# Patient Record
Sex: FEMALE | Race: WHITE | NOT HISPANIC OR LATINO | URBAN - METROPOLITAN AREA
[De-identification: names, ages, dates, MRNs, and addresses within clinical notes are randomized per-mention and may not be internally consistent; named-entity substitution may affect disease eponyms.]

---
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
## 2019-04-05 PROCEDURE — 87086 URINE CULTURE/COLONY COUNT: CPT | Performed by: FAMILY MEDICINE

## 2019-04-05 PROCEDURE — 87077 CULTURE AEROBIC IDENTIFY: CPT | Performed by: FAMILY MEDICINE

## 2021-09-18 ENCOUNTER — WALK IN (OUTPATIENT)
Dept: URGENT CARE | Age: 21
End: 2021-09-18

## 2021-09-18 VITALS
TEMPERATURE: 99.5 F | HEART RATE: 96 BPM | SYSTOLIC BLOOD PRESSURE: 120 MMHG | RESPIRATION RATE: 18 BRPM | DIASTOLIC BLOOD PRESSURE: 84 MMHG

## 2021-09-18 DIAGNOSIS — J06.9 VIRAL URI: Primary | ICD-10-CM

## 2021-09-18 LAB
INTERNAL PROCEDURAL CONTROLS ACCEPTABLE: YES
S PYO AG THROAT QL IA.RAPID: NEGATIVE

## 2021-09-18 PROCEDURE — U0003 INFECTIOUS AGENT DETECTION BY NUCLEIC ACID (DNA OR RNA); SEVERE ACUTE RESPIRATORY SYNDROME CORONAVIRUS 2 (SARS-COV-2) (CORONAVIRUS DISEASE [COVID-19]), AMPLIFIED PROBE TECHNIQUE, MAKING USE OF HIGH THROUGHPUT TECHNOLOGIES AS DESCRIBED BY CMS-2020-01-R: HCPCS | Performed by: NURSE PRACTITIONER

## 2021-09-18 PROCEDURE — 99202 OFFICE O/P NEW SF 15 MIN: CPT | Performed by: NURSE PRACTITIONER

## 2021-09-18 PROCEDURE — 87880 STREP A ASSAY W/OPTIC: CPT | Performed by: NURSE PRACTITIONER

## 2021-09-18 PROCEDURE — U0005 INFEC AGEN DETEC AMPLI PROBE: HCPCS | Performed by: NURSE PRACTITIONER

## 2021-09-18 ASSESSMENT — ENCOUNTER SYMPTOMS
SORE THROAT: 1
TROUBLE SWALLOWING: 0
FATIGUE: 0
SINUS PAIN: 0
HEADACHES: 1
VOMITING: 0
SWOLLEN GLANDS: 0
FEVER: 0
CHILLS: 0
NAUSEA: 0
WHEEZING: 0
CHEST TIGHTNESS: 0
RHINORRHEA: 1
WEAKNESS: 0
COUGH: 1
LIGHT-HEADEDNESS: 0
SHORTNESS OF BREATH: 0
DIZZINESS: 0

## 2021-09-19 LAB
SARS-COV-2 RNA RESP QL NAA+PROBE: NOT DETECTED
SERVICE CMNT-IMP: NORMAL
SERVICE CMNT-IMP: NORMAL

## 2023-08-10 ENCOUNTER — OFFICE VISIT (OUTPATIENT)
Dept: FAMILY MEDICINE CLINIC | Facility: CLINIC | Age: 23
End: 2023-08-10
Payer: COMMERCIAL

## 2023-08-10 VITALS
HEIGHT: 65 IN | HEART RATE: 92 BPM | SYSTOLIC BLOOD PRESSURE: 122 MMHG | BODY MASS INDEX: 35.65 KG/M2 | OXYGEN SATURATION: 97 % | RESPIRATION RATE: 16 BRPM | WEIGHT: 214 LBS | DIASTOLIC BLOOD PRESSURE: 78 MMHG | TEMPERATURE: 99 F

## 2023-08-10 DIAGNOSIS — H61.111: ICD-10-CM

## 2023-08-10 DIAGNOSIS — Z00.00 ENCOUNTER FOR MEDICAL EXAMINATION TO ESTABLISH CARE: Primary | ICD-10-CM

## 2023-08-10 DIAGNOSIS — F41.1 GENERALIZED ANXIETY DISORDER: ICD-10-CM

## 2023-08-10 DIAGNOSIS — Z72.0 TOBACCO USE: ICD-10-CM

## 2023-08-10 DIAGNOSIS — G43.709 CHRONIC MIGRAINE WITHOUT AURA WITHOUT STATUS MIGRAINOSUS, NOT INTRACTABLE: ICD-10-CM

## 2023-08-10 PROCEDURE — 3008F BODY MASS INDEX DOCD: CPT | Performed by: FAMILY MEDICINE

## 2023-08-10 PROCEDURE — 3074F SYST BP LT 130 MM HG: CPT | Performed by: FAMILY MEDICINE

## 2023-08-10 PROCEDURE — 3078F DIAST BP <80 MM HG: CPT | Performed by: FAMILY MEDICINE

## 2023-08-10 PROCEDURE — 99204 OFFICE O/P NEW MOD 45 MIN: CPT | Performed by: FAMILY MEDICINE

## 2023-08-10 RX ORDER — VENLAFAXINE HYDROCHLORIDE 37.5 MG/1
37.5 CAPSULE, EXTENDED RELEASE ORAL DAILY
Qty: 30 CAPSULE | Refills: 1 | Status: SHIPPED | OUTPATIENT
Start: 2023-08-10

## 2023-08-10 RX ORDER — RIZATRIPTAN BENZOATE 5 MG/1
5 TABLET, ORALLY DISINTEGRATING ORAL AS NEEDED
Qty: 12 TABLET | Refills: 1 | Status: SHIPPED | OUTPATIENT
Start: 2023-08-10

## 2023-10-04 DIAGNOSIS — F41.1 GENERALIZED ANXIETY DISORDER: ICD-10-CM

## 2023-10-05 NOTE — TELEPHONE ENCOUNTER
LOV 8/10/2023     LAST LAB     LAST RX   Medication Quantity Refills Start End   venlafaxine ER 37.5 MG Oral Capsule SR 24 Hr           Next OV   Future Appointments   Date Time Provider Sary Plascencia   10/17/2023  5:20 PM Regino Pan MD [facility]        PROTOCOL NONE

## 2023-10-09 DIAGNOSIS — F41.1 GENERALIZED ANXIETY DISORDER: ICD-10-CM

## 2023-10-09 RX ORDER — VENLAFAXINE HYDROCHLORIDE 37.5 MG/1
37.5 CAPSULE, EXTENDED RELEASE ORAL DAILY
Qty: 30 CAPSULE | Refills: 1 | Status: SHIPPED | OUTPATIENT
Start: 2023-10-09

## 2023-10-11 RX ORDER — VENLAFAXINE HYDROCHLORIDE 37.5 MG/1
37.5 CAPSULE, EXTENDED RELEASE ORAL DAILY
Qty: 30 CAPSULE | Refills: 1 | OUTPATIENT
Start: 2023-10-11

## 2023-10-17 ENCOUNTER — OFFICE VISIT (OUTPATIENT)
Facility: LOCATION | Age: 23
End: 2023-10-17
Payer: COMMERCIAL

## 2023-10-17 DIAGNOSIS — H93.8X2 MASS OF LEFT EAR CANAL: Primary | ICD-10-CM

## 2023-10-17 PROCEDURE — 99203 OFFICE O/P NEW LOW 30 MIN: CPT | Performed by: OTOLARYNGOLOGY

## 2023-10-17 NOTE — PROGRESS NOTES
Jenna Willett is a 20year old female. No chief complaint on file. HPI:   Her mom is a deaf . She has mass in her ear canal.  The patient has not had any pain or hearing loss associated. Current Outpatient Medications   Medication Sig Dispense Refill    venlafaxine ER 37.5 MG Oral Capsule SR 24 Hr Take 1 capsule (37.5 mg total) by mouth daily. 30 capsule 1    traZODone 50 MG Oral Tab Take 1 tablet (50 mg total) by mouth nightly. 30 tablet 1    rizatriptan (MAXALT-MLT) 5 MG Oral Tablet Dispersible Take 1 tablet (5 mg total) by mouth as needed for Migraine. 12 tablet 1      Past Medical History:   Diagnosis Date    Acne 01/08/2013    Allergic rhinitis     Anxiety     Depression     Obesity, REMISSION SINCE 2011 09/27/2010      Social History:  Social History     Socioeconomic History    Marital status: Single   Occupational History    Occupation: STUDENT   Tobacco Use    Smoking status: Every Day    Smokeless tobacco: Never    Tobacco comments:     Vape   Vaping Use    Vaping Use: Every day    Substances: Nicotine    Devices: Disposable   Substance and Sexual Activity    Alcohol use: Yes     Alcohol/week: 5.0 standard drinks of alcohol     Types: 5 Cans of beer per week    Drug use: No     Comment: NEVER    Sexual activity: Yes     Partners: Female   Other Topics Concern     Service No    Blood Transfusions No    Caffeine Concern Yes     Comment: pepsi- 1 can per day    Stress Concern Yes    Weight Concern Yes    Exercise No    Bike Helmet No    Seat Belt Yes   Social History Narrative    LIVES  WITH MOM, STEP-DAD IN SINGLE FAMILY HOUSE. No past surgical history on file.       REVIEW OF SYSTEMS:   GENERAL HEALTH: feels well otherwise  GENERAL : denies fever, chills, sweats, weight loss, weight gain  SKIN: denies any unusual skin lesions or rashes  RESPIRATORY: denies shortness of breath with exertion  NEURO: denies headaches    EXAM:   LMP 07/10/2023     System Findings Details Constitutional  Overall appearance - Normal.   Psychiatric  Orientation - Oriented to time, place, person & situation. Appropriate mood and affect. Head/Face  Facial features -- Normal. Skull - Normal.   Eyes  Pupils equal ,round ,react to light and accomidate   Ears, Nose, Throat, Neck  Both ears were evaluated the microscope. The right ear is clear. There is a very tiny pinpoint cyst in the ear canal on the left-hand side tympanic membrane is clear otherwise the pinnas are normal.   Neurological  Memory - Normal. Cranial nerves - Cranial nerves II through XII grossly intact. Lymph Detail  Submental. Submandibular. Anterior cervical. Posterior cervical. Supraclavicular. ASSESSMENT AND PLAN:   1. Mass of left ear canal  She has a small epidermal inclusion cyst in the ear canal on the left-hand side it is very tiny. Given the above we can be conservative. If there should be pain or swelling or hearing loss associated she will see me back. The patient indicates understanding of these issues and agrees to the plan. No follow-ups on file.     Ludin Alatorre MD  10/17/2023  5:31 PM

## 2023-11-17 DIAGNOSIS — F51.01 PRIMARY INSOMNIA: ICD-10-CM

## 2023-11-17 RX ORDER — TRAZODONE HYDROCHLORIDE 50 MG/1
50 TABLET ORAL NIGHTLY
Qty: 30 TABLET | Refills: 1 | Status: SHIPPED | OUTPATIENT
Start: 2023-11-17 | End: 2023-11-20

## 2023-11-17 NOTE — TELEPHONE ENCOUNTER
LOV 9/21/23    LAST LAB    LAST RX   traZODone 50 MG Oral Tab 30 tablet 1 9/21/2023    Sig:   Take 1 tablet (50 mg total) by mouth nightly.          Next OV   Future Appointments   Date Time Provider Sary Plascencia   11/20/2023  9:20 AM Irma Benson DO [facility]        PROTOCOL NONE

## 2023-12-08 DIAGNOSIS — F41.1 GENERALIZED ANXIETY DISORDER: ICD-10-CM

## 2023-12-08 RX ORDER — VENLAFAXINE HYDROCHLORIDE 37.5 MG/1
37.5 CAPSULE, EXTENDED RELEASE ORAL DAILY
Qty: 30 CAPSULE | Refills: 1 | OUTPATIENT
Start: 2023-12-08

## 2024-01-28 DIAGNOSIS — F51.01 PRIMARY INSOMNIA: ICD-10-CM

## 2024-01-29 RX ORDER — TRAZODONE HYDROCHLORIDE 50 MG/1
50 TABLET ORAL NIGHTLY
Qty: 30 TABLET | Refills: 0 | Status: SHIPPED | OUTPATIENT
Start: 2024-01-29

## 2024-01-29 NOTE — TELEPHONE ENCOUNTER
LOV 8/10/2023     LAST LAB     LAST RX  Medication Quantity Refills Start End   traZODone 50 MG Oral Tab 135 tablet 0 11/20/2023 2/18/2024   Sig:   Take 1.5 tablets (75 mg total) by mouth nightly.         Next OV   Future Appointments   Date Time Provider Department Center   2/2/2024  7:00 AM Sue Bird DO [facility]        PROTOCOL none

## 2024-01-30 ENCOUNTER — TELEMEDICINE (OUTPATIENT)
Dept: FAMILY MEDICINE CLINIC | Facility: CLINIC | Age: 24
End: 2024-01-30
Payer: COMMERCIAL

## 2024-01-30 DIAGNOSIS — F41.1 GENERALIZED ANXIETY DISORDER: ICD-10-CM

## 2024-01-30 DIAGNOSIS — Z56.6 WORK STRESS: ICD-10-CM

## 2024-01-30 DIAGNOSIS — F51.01 PRIMARY INSOMNIA: ICD-10-CM

## 2024-01-30 PROCEDURE — 99214 OFFICE O/P EST MOD 30 MIN: CPT | Performed by: FAMILY MEDICINE

## 2024-01-30 RX ORDER — VENLAFAXINE 75 MG/1
75 TABLET ORAL 2 TIMES DAILY
Qty: 30 TABLET | Refills: 1 | Status: SHIPPED | OUTPATIENT
Start: 2024-01-30

## 2024-01-30 RX ORDER — HYDROXYZINE HYDROCHLORIDE 25 MG/1
25 TABLET, FILM COATED ORAL EVERY 6 HOURS PRN
Qty: 30 TABLET | Refills: 1 | Status: SHIPPED | OUTPATIENT
Start: 2024-01-30

## 2024-01-30 SDOH — HEALTH STABILITY - MENTAL HEALTH: OTHER PHYSICAL AND MENTAL STRAIN RELATED TO WORK: Z56.6
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
## 2024-02-09 DIAGNOSIS — F41.1 GENERALIZED ANXIETY DISORDER: ICD-10-CM

## 2024-02-09 RX ORDER — VENLAFAXINE 75 MG/1
75 TABLET ORAL 2 TIMES DAILY
Qty: 60 TABLET | Refills: 1 | Status: SHIPPED | OUTPATIENT
Start: 2024-02-09

## 2024-02-15 DIAGNOSIS — F41.1 GENERALIZED ANXIETY DISORDER: ICD-10-CM

## 2024-02-15 RX ORDER — VENLAFAXINE HYDROCHLORIDE 37.5 MG/1
37.5 CAPSULE, EXTENDED RELEASE ORAL DAILY
Qty: 90 CAPSULE | Refills: 0 | OUTPATIENT
Start: 2024-02-15

## 2024-02-15 NOTE — TELEPHONE ENCOUNTER
LOV    LAST LAB    LAST RX   Medication Quantity Refills Start End   venlafaxine 75 MG Oral Tab 60 tablet 1 2/9/2024 --   Sig:   Take 1 tablet (75 mg total) by mouth 2 (two) times daily.     Route:   Oral         Next OV    PROTOCOL     Psychiatric Non-Scheduled (Anti-Anxiety) [id number]/15/2024 12:26 AM   Protocol Details In person appointment or virtual visit in the past 6 mos or appointment in next 3 mos    Depression Screening completed within the past 12 months

## 2024-02-25 DIAGNOSIS — F51.01 PRIMARY INSOMNIA: ICD-10-CM

## 2024-02-27 RX ORDER — TRAZODONE HYDROCHLORIDE 50 MG/1
50 TABLET ORAL NIGHTLY
Qty: 30 TABLET | Refills: 0 | Status: SHIPPED | OUTPATIENT
Start: 2024-02-27

## 2024-02-27 NOTE — TELEPHONE ENCOUNTER
LOV 1/30/24    LAST LAB     LAST RX   Medication Quantity Refills Start End   traZODone 50 MG Oral Tab 30 tablet 0 1/29/2024 --   Sig:   Take 1 tablet (50 mg total) by mouth nightly.         Next OV No future appointments.     PROTOCOL  NONE

## 2024-02-27 NOTE — TELEPHONE ENCOUNTER
Return in about 6 weeks (around 3/12/2024) for F/U Mental Health. [facility] sent

## 2024-03-30 DIAGNOSIS — F51.01 PRIMARY INSOMNIA: ICD-10-CM

## 2024-04-01 RX ORDER — TRAZODONE HYDROCHLORIDE 50 MG/1
50 TABLET ORAL NIGHTLY
Qty: 90 TABLET | Refills: 0 | Status: SHIPPED | OUTPATIENT
Start: 2024-04-01

## 2024-04-01 NOTE — TELEPHONE ENCOUNTER
LOV 1/30/2024    LAST LAB    LAST RX   Medication Quantity Refills Start End   traZODone 50 MG Oral Tab 30 tablet 0 2/27/2024 --   Sig:   Take 1 tablet (50 mg total) by mouth nightly.     Route:   Oral     Order #:   [id number]         Next OV   Future Appointments   Date Time Provider Department Center   4/9/2024  4:00 PM Sue Bird DO [facility]        PROTOCOL NONE

## 2024-07-17 DIAGNOSIS — F51.01 PRIMARY INSOMNIA: ICD-10-CM

## 2024-07-19 RX ORDER — TRAZODONE HYDROCHLORIDE 50 MG/1
75 TABLET ORAL NIGHTLY
Qty: 135 TABLET | Refills: 0 | Status: SHIPPED | OUTPATIENT
Start: 2024-07-19

## 2024-08-01 DIAGNOSIS — F41.1 GENERALIZED ANXIETY DISORDER: ICD-10-CM

## 2024-08-05 RX ORDER — VENLAFAXINE HYDROCHLORIDE 75 MG/1
75 CAPSULE, EXTENDED RELEASE ORAL DAILY
Qty: 90 CAPSULE | Refills: 0 | Status: SHIPPED | OUTPATIENT
Start: 2024-08-05

## 2024-08-05 NOTE — TELEPHONE ENCOUNTER
Please review: Medication passes protocol, but unable to refill due to high/very high drug interaction warning copied here:    High  Drug-Drug: traZODone and venlafaxine ERSerotonergic effects of trazodone and serotonin/norepinephrine reuptake inhibitors (SNRIs) may be additive. The risk of serotonin syndrome/toxicity may be increased.    Refill passes per [facility] protocol.    No future appointments.  LAST OFFICE VISIT: 4/9/2024 (telemedicine)    Requested Prescriptions   Pending Prescriptions Disp Refills    VENLAFAXINE ER 75 MG Oral Capsule SR 24 Hr [Pharmacy Med Name: VENLAFAXINE HCL ER 75 MG CAP] 90 capsule 0     Sig: TAKE 1 CAPSULE BY MOUTH EVERY DAY       Psychiatric Non-Scheduled (Anti-Anxiety) Passed - 8/1/2024 12:07 AM        Passed - In person appointment or virtual visit in the past 6 mos or appointment in next 3 mos     Recent Outpatient Visits              3 months ago Generalized anxiety disorder    [facility], [address] [place] [address] Sue Bird DO    Telemedicine    6 months ago Generalized anxiety disorder    [address] Sue Bird DO    Telemedicine    8 months ago Primary insomnia    [address] Sue Bird DO    Telemedicine    9 months ago Mass of left ear canal    [facility], [place], [place] Jensen Teresa MD    Office Visit    10 months ago Generalized anxiety disorder    [address] Sue Bird DO    Telemedicine                      Passed - Depression Screening completed within the past 12 months               Recent Outpatient Visits              3 months ago Generalized anxiety disorder    [address]    Telemedicine    6 months ago Generalized anxiety disorder    [address]  Sue Bird,     Telemedicine    8 months ago Primary insomnia    [facility], [address] [place] [address] Sue Bird DO    Telemedicine    9 months ago Mass of left ear canal    [facility], [place], Jensen Mena MD    Office Visit    10 months ago Generalized anxiety disorder    [facility], [address] [place] [address] Sue Bird DO    Telemedicine

## 2024-09-16 ENCOUNTER — OFFICE VISIT (OUTPATIENT)
Dept: FAMILY MEDICINE CLINIC | Facility: CLINIC | Age: 24
End: 2024-09-16
Payer: COMMERCIAL

## 2024-09-16 VITALS
HEIGHT: 65 IN | RESPIRATION RATE: 16 BRPM | HEART RATE: 92 BPM | SYSTOLIC BLOOD PRESSURE: 124 MMHG | BODY MASS INDEX: 37.15 KG/M2 | WEIGHT: 223 LBS | DIASTOLIC BLOOD PRESSURE: 78 MMHG | TEMPERATURE: 99 F | OXYGEN SATURATION: 99 %

## 2024-09-16 DIAGNOSIS — Z71.6 ENCOUNTER FOR TOBACCO USE CESSATION COUNSELING: ICD-10-CM

## 2024-09-16 DIAGNOSIS — F41.1 GENERALIZED ANXIETY DISORDER: ICD-10-CM

## 2024-09-16 DIAGNOSIS — Z00.01 ENCOUNTER FOR GENERAL ADULT MEDICAL EXAMINATION WITH ABNORMAL FINDINGS: Primary | ICD-10-CM

## 2024-09-16 DIAGNOSIS — E55.9 VITAMIN D DEFICIENCY: ICD-10-CM

## 2024-09-16 DIAGNOSIS — G43.709 CHRONIC MIGRAINE WITHOUT AURA WITHOUT STATUS MIGRAINOSUS, NOT INTRACTABLE: ICD-10-CM

## 2024-09-16 DIAGNOSIS — F51.01 PRIMARY INSOMNIA: ICD-10-CM

## 2024-09-16 DIAGNOSIS — Z72.0 TOBACCO USE: ICD-10-CM

## 2024-09-16 DIAGNOSIS — Z53.20 PAP SMEAR OF CERVIX DECLINED: ICD-10-CM

## 2024-09-16 RX ORDER — TRAZODONE HYDROCHLORIDE 50 MG/1
75 TABLET, FILM COATED ORAL NIGHTLY
Qty: 135 TABLET | Refills: 1 | Status: SHIPPED | OUTPATIENT
Start: 2024-09-16

## 2024-09-16 RX ORDER — VENLAFAXINE HYDROCHLORIDE 75 MG/1
75 CAPSULE, EXTENDED RELEASE ORAL DAILY
Qty: 90 CAPSULE | Refills: 1 | Status: SHIPPED | OUTPATIENT
Start: 2024-09-16

## 2024-09-16 NOTE — PROGRESS NOTES
Family Medicine Video Visit  ASSESSMENT & PLAN  Lucrecia Montoya is a 24 year old female who is here for:     1. Encounter for general adult medical examination with abnormal findings  Wellness Exam done today and routine Preventative Care discussed as noted below.   -Pap smear: -  Declined   -G&C testing: declined  -Contraception:  none   -Routine labs ordered.   -Healthy eating habits and regular exercise encouraged   - Comp Metabolic Panel (14); Future  - TSH W Reflex To Free T4; Future  - Lipid Panel; Future    2. Vitamin D deficiency-- Vitamin D [E]; Future    3. Tobacco use- - not Ready to quit today  - Discussed benefits of quitting, as well as resources and quit aids     4. Generalized anxiety disorder  chronic, improved with increased Effexor.  - Contin [id number]  - Contin  Effexor 75mg -   - Hydroxyzine PRN panic.    -CBT/Counseling: currently engaged in counseling.    -Aware of importance of adequate sleep, nutrition, and physical activity.   - venlafaxine ER 75 MG Oral Capsule SR 24 Hr; Take 1 capsule (75 mg total) by mouth daily.  Dispense: 90 capsule; Refill: 1    5. Chronic migraine without aura without status migrainosus, not intractable- chronic, stable with PRN maxalt, uses very infreuqent.      6. Primary insomnia- Chronic, Stable, contin meds as below   - traZODone 50 MG Oral Tab; Take 1.5 tablets (75 mg total) by mouth nightly.  Dispense: 135 tablet; Refill: 1    7. Pap smear of cervix declined- same sex relationship, no prior male partners, discussed risk of cervical cancer, plan to defer for now     Follow-Up: Return in about 6 months (around 3/16/2025) for F/U Mental Health.      Sue Bird DO      CC: Physical    SUBJECTIVE   History of Present Illness:  History obtained from patient.    Lucrecia Montoya is a 24 year old female who presents for Physical     Since last visit, engaged!!!     ANNUAL PHYSICAL  Menses: Regular without any break through bleeding  --- every third period is  painful, but overall does okay   LMP: Patient's last menstrual period was 2024.  Concern for STI: Denies   Contraception: none- same sex relationship-   Cervical Cancer Screening- Pap   PMH of Abnormal Pap: denies   Exercise: generally tries to be active  Healthy eating habits:  Well-rounded  Tobacco: Vaping daily -- nicotine   Immunizations: PCV eligible       ANXIETY- Chronically has struggled for many years- middle school.  Counseling in middle school through HS.  College was started on Zoloft. No SI/HI.  No mental health hosp.   Prior meds- Sertraline, felt like she had a normal sleep schedule   Effexor started Aug 2023, dose increased to 75mg 2024.    Trazodone started 2023  Counseling- off/on   24 - Doing well no concerns.   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 90 capsule 1    traZODone 50 MG Oral Tab Take 1.5 tablets (75 mg total) by mouth nightly. 135 tablet 1    rizatriptan (MAXALT-MLT) 5 MG Oral Tablet Dispersible Take 1 tablet (5 mg total) by mouth as needed for Migraine. 12 tablet 1    hydrOXYzine 25 MG Oral Tab Take 1 tablet (25 mg total) by mouth every 6 (six) hours as needed for Anxiety. 30 tablet 1      Past Medical History:  Past Medical History:    Acne    Allergic rhinitis    Anxiety    Depression    Obesity, REMISSION SINCE       Past Surgical History:  History reviewed. No pertinent surgical  history.   Family History:  Family History   Problem Relation Age of Onset    Obesity Father     Psychiatric Father         ETOHIC    Other (Other) Father         MIGRAINES    Lipids Maternal Grandmother     Lipids Maternal Grandfather     Cancer Paternal Grandmother 72        LUNG (SMOKER)    Diabetes Paternal Grandfather     Obesity Paternal Grandfather       Social History:  Social History     Socioeconomic History    Marital status: Single   Occupational History    Occupation: STUDENT   Tobacco Use    Smoking status: Never    Smokeless tobacco: Never    Tobacco comments:     Vape   Vaping Use    Vaping status: Every Day    Substances: Nicotine    Devices: Disposable   Substance and Sexual Activity    Alcohol use: Yes     Alcohol/week: 5.0 standard drinks of alcohol     Types: 5 Cans of beer per week    Drug use: No     Comment: NEVER    Sexual activity: Yes     Partners: Female   Other Topics Concern     Service No    Blood Transfusions No    Caffeine Concern Yes    Stress Concern Yes    Weight Concern Yes    Exercise No    Bike Helmet No    Seat Belt Yes   Social History Narrative    LIVES  WITH MOM, STEP-DAD IN SINGLE FAMILY HOUSE.       Allergies:  Allergies   Allergen Reactions    Grass RASH          OBJECTIVE   VITALS: /78   Pulse 92   Temp 98.5 °F (36.9 °C) (Temporal)   Resp 16   Ht 5' 5\" (1.651 m)   Wt 223 lb (101.2 kg)   LMP 08/26/2024   SpO2 99%   BMI 37.11 kg/m²    BP Readings from Last 3 Encounters:   09/16/24 124/78   08/10/23 122/78   10/03/19 110/80       Wt Readings from Last 3 Encounters:   09/16/24 223 lb (101.2 kg)   08/10/23 214 lb (97.1 kg)   10/03/19 168 lb (76.2 kg)       PHYSICAL EXAM:  GEN: pleasant, well-appearing in NAD, AOX3  SKIN: no visible rashes, lesions, or evidence of trauma  HEENT: PERRL, EOMI, moist mucous membranes, oropharynx clear, TM clear, nares patent, no Thyromegaly or nodule.   CV: RRR, no murmurs or abnl heart sounds   PULM: Clear to auscultation,  No wheezes, rales, rhonchi.  Non-labored breathing.  ABD: Soft, non-tender, non-distended, + BS, no rigidity/guarding  EXT:  Warm, well perfused, no lower extremity edema  NEURO: CNs grossly intact, no focal weakness  MSK: moves all 4 extremities without difficulty  PSYCH: mood and affect are appropriate     Tobacco:  Social History     Tobacco Use   Smoking Status Never   Smokeless Tobacco Never   Tobacco Comments    Vape     E-Cigarettes/Vaping       Questions Responses    E-Cigarette Use Current Every Day User          E-Cigarette/Vaping Substances       Questions Responses    Nicotine Yes          E-Cigarette/Vaping Devices       Questions Responses    Disposable Yes           Tobacco cessation counseling for <3 minutes.       Sue Bird, DO

## 2025-06-10 ENCOUNTER — TELEPHONE (OUTPATIENT)
Dept: FAMILY MEDICINE CLINIC | Facility: CLINIC | Age: 25
End: 2025-06-10